# Patient Record
(demographics unavailable — no encounter records)

---

## 2022-12-28 DIAGNOSIS — J02.0 STREP PHARYNGITIS: Primary | ICD-10-CM

## 2022-12-28 RX ORDER — AMOXICILLIN 500 MG/1
500 CAPSULE ORAL 2 TIMES DAILY
Qty: 20 CAPSULE | Refills: 0 | Status: SHIPPED | OUTPATIENT
Start: 2022-12-28 | End: 2023-01-07

## 2022-12-28 NOTE — PROGRESS NOTES
Pt's mother seen in [facility] by me today and pt presenting at home with same symptoms including severe sore throat and enlarged tonsils. 1. Strep pharyngitis  - amoxicillin (AMOXIL) 500 MG capsule; Take 1 capsule by mouth 2 times daily for 10 days  Dispense: 20 capsule; Refill: 0     Plan:      Pt placed on antibiotics. Use of OTC analgesics recommended as well as salt water gargles. Pt advised that he will be infectious for 24 hours after starting antibiotics. Follow up as needed.